# Patient Record
Sex: MALE | Race: WHITE | URBAN - NONMETROPOLITAN AREA
[De-identification: names, ages, dates, MRNs, and addresses within clinical notes are randomized per-mention and may not be internally consistent; named-entity substitution may affect disease eponyms.]

---

## 2017-09-22 ENCOUNTER — TRANSFERRED RECORDS (OUTPATIENT)
Dept: HEALTH INFORMATION MANAGEMENT | Facility: HOSPITAL | Age: 67
End: 2017-09-22

## 2017-10-12 ENCOUNTER — OFFICE VISIT (OUTPATIENT)
Dept: SLEEP MEDICINE | Facility: HOSPITAL | Age: 67
End: 2017-10-12
Attending: INTERNAL MEDICINE
Payer: COMMERCIAL

## 2017-10-12 VITALS
DIASTOLIC BLOOD PRESSURE: 62 MMHG | WEIGHT: 248 LBS | OXYGEN SATURATION: 91 % | HEART RATE: 62 BPM | RESPIRATION RATE: 12 BRPM | BODY MASS INDEX: 38.92 KG/M2 | SYSTOLIC BLOOD PRESSURE: 130 MMHG | HEIGHT: 67 IN

## 2017-10-12 DIAGNOSIS — G47.33 OBSTRUCTIVE SLEEP APNEA SYNDROME: Primary | ICD-10-CM

## 2017-10-12 PROCEDURE — 99212 OFFICE O/P EST SF 10 MIN: CPT

## 2017-10-12 PROCEDURE — 99212 OFFICE O/P EST SF 10 MIN: CPT | Performed by: INTERNAL MEDICINE

## 2017-10-12 RX ORDER — VENLAFAXINE HYDROCHLORIDE 150 MG/1
150 TABLET, EXTENDED RELEASE ORAL
COMMUNITY

## 2017-10-12 RX ORDER — VENLAFAXINE HYDROCHLORIDE 75 MG/1
75 TABLET, EXTENDED RELEASE ORAL
COMMUNITY

## 2017-10-12 NOTE — NURSING NOTE
,Patient ID checked with name and date of birth. Reviewed allergies and home medications. Took Vitals and brief history.   schedled the patient for an overnight sleep test and reviewed the instructions with him and his wife they verbalized understanding

## 2017-10-12 NOTE — PROGRESS NOTES
"68 y/o referred by Juan Galeano with CMAILO. Pts wife here with him, she notes loud snoring and occ apneas. Sleep hours are [place] to 10, freq wake ups. Occ morning headaches,also restless legs. A sleep study done years ago show a lot of leg movements. He is sleepy during the day but it doesn't bother him much. His weight is up 15 lbs in the past 5 years.     PMH CAD/CABG/? PAH, DM T2, hypertension, hx PLMs, chronic back pain, hx of cardiomyopathy      SH , we treat his wife for CAMILO, his daughter has 'snoring arousals'    PE  /62  Pulse 62  Resp 12  Ht 5' 7\" (1.702 m)  Wt 248 lb (112.5 kg)  SpO2 91%  BMI 38.84 kg/m2                  Heent narrowed pharynx,       Current Outpatient Prescriptions:      Metoprolol Succinate (TOPROL XL PO), Take 25 mg by mouth daily, Disp: , Rfl:      TRAZODONE HCL PO, Take 50 mg by mouth At Bedtime, Disp: , Rfl:      venlafaxine (EFFEXOR-ER) 150 MG TB24 24 hr tablet, Take 150 mg by mouth daily (with breakfast), Disp: , Rfl:      venlafaxine (EFFEXOR-ER) 75 MG TB24 24 hr tablet, Take 75 mg by mouth daily (with breakfast), Disp: , Rfl:      LISINOPRIL PO, Take 10 mg by mouth daily, Disp: , Rfl:      insulin glargine (LANTUS) 100 UNIT/ML injection, Inject Subcutaneous At Bedtime, Disp: , Rfl:      insulin aspart prot & aspart (NOVOLOG MIX 70/30 FLEXPEN) injection, Inject Subcutaneous 2 times daily (with meals), Disp: , Rfl:      A/ CAMILO and poss PLMs, sleep study ordered.   "

## 2017-10-12 NOTE — MR AVS SNAPSHOT
After Visit Summary   10/12/2017    Mateo Reyes    MRN: [id number]           Patient Information     Date Of Birth          1950        Visit Information        Provider Department      10/12/2017 1:30 PM Gelacio Zhong MD [facility]        Today's Diagnoses     Obstructive sleep apnea syndrome    -  1      Care Instructions    In order to help your stay at the Sleep Center to be as comfortable as possible and to obtain the best sleep study possible, the Sleep Center Staff has established the following guidelines:    1.  Please attempt to be here 15 minutes prior to your scheduled appointment time.  If you anticipate being late or you cannot make your overnight appointment, please call us at [phone] or call [phone] and ask for the Sleep Center.  PLEASE MAKE EVERY ATTEMPT TO MAKE YOUR APPOINTMENT.  A SLEEP TECHNICIAN AND A SLEEP ROOM HAS BEEN RESERVED JUST FOR YOU.    2. When you arrive at [facility], please park in the upper lot, by [address].  Enter the hospital at the Emergency Room Entrance.  Follow the signs to the Emergency Room Admitting Desk and tell them you are here for a sleep study in the Sleep Disorder Center.    3. Do not stop any medications, unless specifically requested.  Be sure to bring all medications that you need with you.    4. Do not use any hair creams or gels, moisturizers, rinses or sprays the day of your study.  FOR MALES:  if you are usually clean shaven, please shave before you come in; FOR FEMALES:  do not wear make-up or be prepared to remove it.  This will improve the quality of the study.    5. Please DO NOT USE CAFFEINE OR ALCOHOL after 2:00 PM the day of your test unless advised otherwise.    6. Do not take any naps the day of your test.    7. Attachment of monitoring equipment will take approximately one hour, including an explanation of the test.    8. Bring comfortable night clothes to sleep in, two-piece, cotton pajamas or shorts  are best.    9. If you have a pillow that you prefer using, please bring it with you; but do not forget to take it home with you in the morning.    10. Most of our studies are complete by approximately 7:00 AM.  In most instances we may wake you during your normal wake time or the time you request to be awakened.    If you have any special needs or questions related to this information or your test, please feel free to call the Sleep Disorder Center at [phone] or [phone] and ask for the Sleep Disorder Center.  Please make every effort to keep your appointment.  A sleep technician and a sleep room have been reserved just for you.  In the event that you are unable to make your appointment, please call as soon as possible to notify us of your cancellation.            Follow-ups after your visit        Future tests that were ordered for you today     Open Future Orders        Priority Expected Expires Ordered    Comprehensive Sleep Study Routine  4/10/2018 10/12/2017            Who to contact     If you have questions or need follow up information about today's clinic visit or your schedule please contact [facility] directly at [phone].  Normal or non-critical lab and imaging results will be communicated to you by [employer] Grou[employer], letter or phone within 4 business days after the clinic has received the results. If you do not hear from us within 7 days, please contact the clinic through [employer] or phone. If you have a critical or abnormal lab result, we will notify you by phone as soon as possible.  Submit refill requests through [employer] or call your pharmacy and they will forward the refill request to us. Please allow 3 business days for your refill to be completed.          Additional Information About Your Visit        [employer] Information     [employer] lets you send messages to your doctor, view your test results, renew your prescriptions, schedule appointments and more. To sign up, go to [email or web address] .  "Click on \"Log in\" on the left side of the screen, which will take you to the Welcome page. Then click on \"Sign up Now\" on the right side of the page.     You will be asked to enter the access code listed below, as well as some personal information. Please follow the directions to create your username and password.     Your access code is: [email or web address]  Expires: 1/10/2018  1:55 PM     Your access code will  in 90 days. If you need help or a new code, please call your [place] clinic or [phone].        Care EveryWhere ID     This is your Care EveryWhere ID. This could be used by other organizations to access your [place] medical records  [id number]        Your Vitals Were     Pulse Respirations Height Pulse Oximetry BMI (Body Mass Index)       62 12 5' 7\" (1.702 m) 91% 38.84 kg/m2        Blood Pressure from Last 3 Encounters:   10/12/17 130/62    Weight from Last 3 Encounters:   10/12/17 248 lb (112.5 kg)               Primary Care Provider Office Phone # Fax #    Marleny Galeano, CATE [phone]       [facility] MEDICINE [address]  [facility] [address]        Equal Access to Services     AILYN PALMER AH: Hadii aad ku hadasho Soomaali, waaxda luqadaha, qaybta kaalmada adeegyada, waxay idiin haywillyn gurinder brandt . So [facility] [phone].    ATENCIÓN: Si habla español, tiene a marina disposición servicios gratuitos de asistencia lingüística. ame al [phone].    We comply with applicable federal civil rights laws and [place] laws. We do not discriminate on the basis of race, color, national origin, age, disability, sex, sexual orientation, or gender identity.            Thank you!     Thank you for choosing [facility]  for your care. Our goal is always to provide you with excellent care. Hearing back from our patients is one way we can continue to improve our services. Please take a few minutes to complete the written survey that you may receive in the mail after your visit " with us. Thank you!             Your Updated Medication List - Protect others around you: Learn how to safely use, store and throw away your medicines at www.disposemymeds.org.          This list is accurate as of: 10/12/17  1:55 PM.  Always use your most recent med list.                   Brand Name Dispense Instructions for use Diagnosis    insulin glargine 100 UNIT/ML injection    LANTUS     Inject Subcutaneous At Bedtime        LISINOPRIL PO      Take 10 mg by mouth daily        NovoLOG MIX 70/30 FLEXPEN injection   Generic drug:  insulin aspart prot & aspart      Inject Subcutaneous 2 times daily (with meals)        TOPROL XL PO      Take 25 mg by mouth daily        TRAZODONE HCL PO      Take 50 mg by mouth At Bedtime        * venlafaxine 150 MG Tb24 24 hr tablet    EFFEXOR-ER     Take 150 mg by mouth daily (with breakfast)        * venlafaxine 75 MG Tb24 24 hr tablet    EFFEXOR-ER     Take 75 mg by mouth daily (with breakfast)        * Notice:  This list has 2 medication(s) that are the same as other medications prescribed for you. Read the directions carefully, and ask your doctor or other care provider to review them with you.

## 2017-10-12 NOTE — PATIENT INSTRUCTIONS
In order to help your stay at the Sleep Center to be as comfortable as possible and to obtain the best sleep study possible, the Sleep Center Staff has established the following guidelines:    1.  Please attempt to be here 15 minutes prior to your scheduled appointment time.  If you anticipate being late or you cannot make your overnight appointment, please call us at [phone] or call [phone] and ask for the Sleep Center.  PLEASE MAKE EVERY ATTEMPT TO MAKE YOUR APPOINTMENT.  A SLEEP TECHNICIAN AND A SLEEP ROOM HAS BEEN RESERVED JUST FOR YOU.    2. When you arrive at [facility], please park in the upper lot, by [address].  Enter the hospital at the Emergency Room Entrance.  Follow the signs to the Emergency Room Admitting Desk and tell them you are here for a sleep study in the Sleep Disorder Center.    3. Do not stop any medications, unless specifically requested.  Be sure to bring all medications that you need with you.    4. Do not use any hair creams or gels, moisturizers, rinses or sprays the day of your study.  FOR MALES:  if you are usually clean shaven, please shave before you come in; FOR FEMALES:  do not wear make-up or be prepared to remove it.  This will improve the quality of the study.    5. Please DO NOT USE CAFFEINE OR ALCOHOL after 2:00 PM the day of your test unless advised otherwise.    6. Do not take any naps the day of your test.    7. Attachment of monitoring equipment will take approximately one hour, including an explanation of the test.    8. Bring comfortable night clothes to sleep in, two-piece, cotton pajamas or shorts are best.    9. If you have a pillow that you prefer using, please bring it with you; but do not forget to take it home with you in the morning.    10. Most of our studies are complete by approximately 7:00 AM.  In most instances we may wake you during your normal wake time or the time you request to be awakened.    If you have any special needs or  questions related to this information or your test, please feel free to call the Sleep Disorder Center at [phone] or [phone] and ask for the Sleep Disorder Center.  Please make every effort to keep your appointment.  A sleep technician and a sleep room have been reserved just for you.  In the event that you are unable to make your appointment, please call as soon as possible to notify us of your cancellation.

## 2017-10-18 ENCOUNTER — THERAPY VISIT (OUTPATIENT)
Dept: SLEEP MEDICINE | Facility: HOSPITAL | Age: 67
End: 2017-10-18
Attending: INTERNAL MEDICINE
Payer: COMMERCIAL

## 2017-10-18 DIAGNOSIS — G47.33 OBSTRUCTIVE SLEEP APNEA SYNDROME: ICD-10-CM

## 2017-10-18 PROCEDURE — 95810 POLYSOM 6/> YRS 4/> PARAM: CPT

## 2017-10-18 PROCEDURE — 95810 POLYSOM 6/> YRS 4/> PARAM: CPT | Mod: 26 | Performed by: INTERNAL MEDICINE

## 2017-10-18 NOTE — MR AVS SNAPSHOT
"              After Visit Summary   10/18/2017    Mateo Reyes    MRN: [id number]           Patient Information     Date Of Birth          1950        Visit Information        Provider Department      10/18/2017 7:30 PM [facility]        Today's Diagnoses     Obstructive sleep apnea syndrome           Follow-ups after your visit        Who to contact     If you have questions or need follow up information about today's clinic visit or your schedule please contact [facility] directly at [phone].  Normal or non-critical lab and imaging results will be communicated to you by [employer], letter or phone within 4 business days after the clinic has received the results. If you do not hear from us within 7 days, please contact the clinic through [employer] or phone. If you have a critical or abnormal lab result, we will notify you by phone as soon as possible.  Submit refill requests through [employer] or call your pharmacy and they will forward the refill request to us. Please allow 3 business days for your refill to be completed.          Additional Information About Your Visit        [employer] Information     [employer] lets you send messages to your doctor, view your test results, renew your prescriptions, schedule appointments and more. To sign up, go to [email or web address] . Click on \"Log in\" on the left side of the screen, which will take you to the Welcome page. Then click on \"Sign up Now\" on the right side of the page.     You will be asked to enter the access code listed below, as well as some personal information. Please follow the directions to create your username and password.     Your access code is: [email or web address]  Expires: 1/10/2018  1:55 PM     Your access code will  in 90 days. If you need help or a new code, please call your Center clinic or [phone].        Care EveryWhere ID     This is your Care EveryWhere ID. This could be used by other organizations to access your " [place] medical records  [id number]         Blood Pressure from Last 3 Encounters:   10/12/17 130/62    Weight from Last 3 Encounters:   10/12/17 248 lb (112.5 kg)              We Performed the Following     Comprehensive Sleep Study     SLEEP STUDY - HIM SCAN        Primary Care Provider Office Phone # Fax #    Marleny Galeano CATE [phone]       [facility] MEDICINE [address]  [facility] [address]        Equal Access to Services     AILYN PALMER : Hadii aad ku hadasho Soomaali, waaxda luqadaha, qaybta kaalmada adeegyada, waxay idiin hayaan adeeg kharash la'aan . So [facility] [phone].    ATENCIÓN: Si habla español, tiene a marina disposición servicios gratuitos de asistencia lingüística. [facility] [phone].    We comply with applicable federal civil rights laws and [place] laws. We do not discriminate on the basis of race, color, national origin, age, disability, sex, sexual orientation, or gender identity.            Thank you!     Thank you for choosing [facility]  for your care. Our goal is always to provide you with excellent care. Hearing back from our patients is one way we can continue to improve our services. Please take a few minutes to complete the written survey that you may receive in the mail after your visit with us. Thank you!             Your Updated Medication List - Protect others around you: Learn how to safely use, store and throw away your medicines at www.disposemymeds.org.          This list is accurate as of: 10/18/17 11:59 PM.  Always use your most recent med list.                   Brand Name Dispense Instructions for use Diagnosis    insulin glargine 100 UNIT/ML injection    LANTUS     Inject Subcutaneous At Bedtime        LISINOPRIL PO      Take 10 mg by mouth daily        NovoLOG MIX 70/30 FLEXPEN injection   Generic drug:  insulin aspart prot & aspart      Inject Subcutaneous 2 times daily (with meals)        TOPROL XL PO      Take 25 mg by mouth daily         TRAZODONE HCL PO      Take 50 mg by mouth At Bedtime        * venlafaxine 150 MG Tb24 24 hr tablet    EFFEXOR-ER     Take 150 mg by mouth daily (with breakfast)        * venlafaxine 75 MG Tb24 24 hr tablet    EFFEXOR-ER     Take 75 mg by mouth daily (with breakfast)        * Notice:  This list has 2 medication(s) that are the same as other medications prescribed for you. Read the directions carefully, and ask your doctor or other care provider to review them with you.

## 2017-10-19 NOTE — PROGRESS NOTES
The Pt was identified by name and  as well as wristband.  He is a 66 yo man with c/o poor sleep.  His wife reports snoring and apneas.  Sleep testing and possible findings were discussed during hook up.  CAMILO and CPAP therapy were explained as he was fitted with a ResMed Airfit N20 large mask.  Sleep onset was normal.  He exhibits mostly moderate snoring with frequent snore arousals.  There were also PLM arousals noted.  No REM sleep was noted until the end of the study. The SpO2 baseline dropped to 90% in REM but still no SDB.  The SpO2 baseline in sleep was 92%. The lowest SpO2 was 88%.  He had scattered PVCs.  CPAP was not attempted as he did not appear to meet criteria The preliminary results were discussed.  After reviewing the vendor choices, he prefers   [employer] for his DME.

## 2017-10-23 NOTE — PROGRESS NOTES
Patient is a 68 y/o male in with c/o snoring, observed apnea and poor sleep.  Moderate snoring with scattered arousals noted.  PLM's noted t/o study, some may have been respiratory related.  REM latency delayed.  Patient was not attempted on CPAP as he did not appear to meet criteria.  Patient tolerated study well.

## 2017-10-24 NOTE — PROGRESS NOTES
66 y/o referred by Juan Galeano for excessive daytime somnolence.   Overnight 18 channel polysomnography was done 10/18, I reviewed the raw data in detail.Please see scanned sleep study for full results.Sleep efficiency was normal with a normal sleep latency and a prolonged REM latency. Sleep architecture shows all stages seen in usual amounts for age. Baseline oxyhemoglobin saturation was 92%. The ECG was monitored and no arrhythmias were seen. There were frequent legs movements and arousals, the PLM index was 90.             Technician noted moderate snoring. We measured no significant sleep disordered breathing, the AHI was 0.    Impression: PLMS  Will discuss treatment from my office.

## 2017-11-14 ENCOUNTER — OFFICE VISIT (OUTPATIENT)
Dept: SLEEP MEDICINE | Facility: HOSPITAL | Age: 67
End: 2017-11-14
Attending: INTERNAL MEDICINE
Payer: COMMERCIAL

## 2017-11-14 DIAGNOSIS — G25.81 RESTLESS LEGS SYNDROME (RLS): Primary | ICD-10-CM

## 2017-11-14 PROCEDURE — 99211 OFF/OP EST MAY X REQ PHY/QHP: CPT

## 2017-11-14 PROCEDURE — 99211 OFF/OP EST MAY X REQ PHY/QHP: CPT | Performed by: INTERNAL MEDICINE

## 2017-11-14 RX ORDER — GABAPENTIN 300 MG/1
CAPSULE ORAL
Qty: 60 CAPSULE | Refills: 0 | Status: SHIPPED | OUTPATIENT
Start: 2017-11-14 | End: 2017-12-14

## 2017-11-14 NOTE — MR AVS SNAPSHOT
"              After Visit Summary   11/14/2017    Mateo Reyes    MRN: [id number]           Patient Information     Date Of Birth          1950        Visit Information        Provider Department      11/14/2017 2:30 PM Gelacio Zhong MD [facility]        Today's Diagnoses     Restless legs syndrome (RLS)    -  1       Follow-ups after your visit        Your next 10 appointments already scheduled     Dec 19, 2017  2:30 PM CST   Return Visit with Gelacio Zhong MD   [facility] ([facility] )    [address] [place] [address]   [phone]              Who to contact     If you have questions or need follow up information about today's clinic visit or your schedule please contact [facility] directly at [phone].  Normal or non-critical lab and imaging results will be communicated to you by [employer], letter or phone within 4 business days after the clinic has received the results. If you do not hear from us within 7 days, please contact the clinic through [employer] or phone. If you have a critical or abnormal lab result, we will notify you by phone as soon as possible.  Submit refill requests through [employer] or call your pharmacy and they will forward the refill request to us. Please allow 3 business days for your refill to be completed.          Additional Information About Your Visit        [employer] Information     [employer] lets you send messages to your doctor, view your test results, renew your prescriptions, schedule appointments and more. To sign up, go to [email or web address] . Click on \"Log in\" on the left side of the screen, which will take you to the Welcome page. Then click on \"Sign up Now\" on the right side of the page.     You will be asked to enter the access code listed below, as well as some personal information. Please follow the directions to create your username and password.     Your access code is: [email or web address]  Expires: 1/10/2018 12:55 PM     Your access code " will  in 90 days. If you need help or a new code, please call your [place] clinic or [phone].        Care EveryWhere ID     This is your Care EveryWhere ID. This could be used by other organizations to access your [place] medical records  [id number]         Blood Pressure from Last 3 Encounters:   10/12/17 130/62    Weight from Last 3 Encounters:   10/12/17 248 lb (112.5 kg)              Today, you had the following     No orders found for display         Today's Medication Changes          These changes are accurate as of: 17  3:08 PM.  If you have any questions, ask your nurse or doctor.               Start taking these medicines.        Dose/Directions    gabapentin 300 MG capsule   Commonly known as:  NEURONTIN   Used for:  Restless legs syndrome (RLS)        Take 1 tablet qhs for a week, then 2 tabs po qhs   Quantity:  60 capsule   Refills:  0            Where to get your medicines      These medications were sent to [facility] [address] [place] [address]  AT [facility] OF [facility] [address] &    [facility] [address]     Phone:  [phone]     gabapentin 300 MG capsule                Primary Care Provider Office Phone # Fax #    Marleny Galeano, CATE [phone]       [facility] [address] [facility] [address]        Equal Access to Services     AILYN PALMER AH: Hadii aad ku hadasho Soomaali, waaxda luqadaha, qaybta kaalmada adeegyada, constance hernandez. So [facility] [phone].    ATENCIÓN: Si habla español, tiene a marina disposición servicios gratuitos de asistencia lingüística. Llame al [phone].    We comply with applicable federal civil rights laws and [place] laws. We do not discriminate on the basis of race, color, national origin, age, disability, sex, sexual orientation, or gender identity.            Thank you!     Thank you for choosing [facility]  for your care. Our goal is always to  provide you with excellent care. Hearing back from our patients is one way we can continue to improve our services. Please take a few minutes to complete the written survey that you may receive in the mail after your visit with us. Thank you!             Your Updated Medication List - Protect others around you: Learn how to safely use, store and throw away your medicines at www.disposemymeds.org.          This list is accurate as of: 11/14/17  3:08 PM.  Always use your most recent med list.                   Brand Name Dispense Instructions for use Diagnosis    gabapentin 300 MG capsule    NEURONTIN    60 capsule    Take 1 tablet qhs for a week, then 2 tabs po qhs    Restless legs syndrome (RLS)       insulin glargine 100 UNIT/ML injection    LANTUS     Inject Subcutaneous At Bedtime        LISINOPRIL PO      Take 10 mg by mouth daily        NovoLOG MIX 70/30 FLEXPEN injection   Generic drug:  insulin aspart prot & aspart      Inject Subcutaneous 2 times daily (with meals)        TOPROL XL PO      Take 25 mg by mouth daily        TRAZODONE HCL PO      Take 50 mg by mouth At Bedtime        * venlafaxine 150 MG Tb24 24 hr tablet    EFFEXOR-ER     Take 150 mg by mouth daily (with breakfast)        * venlafaxine 75 MG Tb24 24 hr tablet    EFFEXOR-ER     Take 75 mg by mouth daily (with breakfast)        * Notice:  This list has 2 medication(s) that are the same as other medications prescribed for you. Read the directions carefully, and ask your doctor or other care provider to review them with you.

## 2017-11-14 NOTE — PROGRESS NOTES
Sleep study discussed, no CAMILO but a lot of PLMs, arousal index of 90!   He doesn't really have RLS, his wife does note a lot of twitching at night. He is sleepy during the day so I'll try neurotin, back in a month.  There were no vitals taken for this visit.

## 2017-12-14 DIAGNOSIS — G25.81 RESTLESS LEGS SYNDROME (RLS): ICD-10-CM

## 2017-12-14 RX ORDER — GABAPENTIN 300 MG/1
CAPSULE ORAL
Qty: 60 CAPSULE | Refills: 0 | Status: SHIPPED | OUTPATIENT
Start: 2017-12-14

## 2017-12-20 ENCOUNTER — OFFICE VISIT (OUTPATIENT)
Dept: SLEEP MEDICINE | Facility: HOSPITAL | Age: 67
End: 2017-12-20
Attending: INTERNAL MEDICINE
Payer: COMMERCIAL

## 2017-12-20 VITALS
HEART RATE: 62 BPM | SYSTOLIC BLOOD PRESSURE: 140 MMHG | RESPIRATION RATE: 14 BRPM | OXYGEN SATURATION: 94 % | DIASTOLIC BLOOD PRESSURE: 60 MMHG

## 2017-12-20 DIAGNOSIS — G25.81 RESTLESS LEGS SYNDROME (RLS): Primary | ICD-10-CM

## 2017-12-20 PROCEDURE — 99211 OFF/OP EST MAY X REQ PHY/QHP: CPT | Performed by: INTERNAL MEDICINE

## 2017-12-20 PROCEDURE — 99211 OFF/OP EST MAY X REQ PHY/QHP: CPT

## 2017-12-20 RX ORDER — GABAPENTIN 600 MG/1
TABLET ORAL
Qty: 90 TABLET | Refills: 3 | Status: SHIPPED | OUTPATIENT
Start: 2017-12-20

## 2017-12-20 NOTE — MR AVS SNAPSHOT
"              After Visit Summary   2017    Mateo Reyes    MRN: [id number]           Patient Information     Date Of Birth          1950        Visit Information        Provider Department      2017 2:30 PM Gelacio Zhong MD [facility]        Today's Diagnoses     Restless legs syndrome (RLS)    -  1       Follow-ups after your visit        Who to contact     If you have questions or need follow up information about today's clinic visit or your schedule please contact [facility] directly at [phone].  Normal or non-critical lab and imaging results will be communicated to you by MyChart, letter or phone within 4 business days after the clinic has received the results. If you do not hear from us within 7 days, please contact the clinic through [facility] or phone. If you have a critical or abnormal lab result, we will notify you by phone as soon as possible.  Submit refill requests through [facility] or call your pharmacy and they will forward the refill request to us. Please allow 3 business days for your refill to be completed.          Additional Information About Your Visit        MyChart Information     [facility] lets you send messages to your doctor, view your test results, renew your prescriptions, schedule appointments and more. To sign up, go to [email or web address] . Click on \"Log in\" on the left side of the screen, which will take you to the Welcome page. Then click on \"Sign up Now\" on the right side of the page.     You will be asked to enter the access code listed below, as well as some personal information. Please follow the directions to create your username and password.     Your access code is: [email or web address]  Expires: 1/10/2018 12:55 PM     Your access code will  in 90 days. If you need help or a new code, please call your [facility] clinic or [phone].        Care EveryWhere ID     This is your Care EveryWhere ID. This could be used by other organizations to access your " [facility] medical records  [id number]        Your Vitals Were     Pulse Respirations Pulse Oximetry             62 14 94%          Blood Pressure from Last 3 Encounters:   12/20/17 140/60   10/12/17 130/62    Weight from Last 3 Encounters:   10/12/17 248 lb (112.5 kg)              Today, you had the following     No orders found for display         Today's Medication Changes          These changes are accurate as of: 12/20/17  2:45 PM.  If you have any questions, ask your nurse or doctor.               These medicines have changed or have updated prescriptions.        Dose/Directions    * gabapentin 300 MG capsule   Commonly known as:  NEURONTIN   This may have changed:  Another medication with the same name was added. Make sure you understand how and when to take each.   Used for:  Restless legs syndrome (RLS)        Take 1 tablet qhs for a week, then 2 tabs po qhs   Quantity:  60 capsule   Refills:  0       * gabapentin 600 MG tablet   Commonly known as:  NEURONTIN   This may have changed:  You were already taking a medication with the same name, and this prescription was added. Make sure you understand how and when to take each.   Used for:  Restless legs syndrome (RLS)        1 po qhs   Quantity:  90 tablet   Refills:  3       * Notice:  This list has 2 medication(s) that are the same as other medications prescribed for you. Read the directions carefully, and ask your doctor or other care provider to review them with you.         Where to get your medicines      These medications were sent to [facility] - [place], [place] - [address] AT [address] [facility]  [address], [facility] [address]     Phone:  [phone]     gabapentin 600 MG tablet                Primary Care Provider Office Phone # Fax #    Marleny Galeano [phone]       [facility] [address] [facility] [address]        Equal Access to Services     AILYN PALMER  AH: Hadii dagoberto ohdennisjovani Tyron, waaxda luqadaha, qaybta kanorma reid, constance guanakoin hayaaallyssa keaneann-marie perdomo laRoulaluis hernandez. [facility] [phone].    ATENCIÓN: Si quinnla santos, tiene a marina disposición servicios gratuitos de asistencia lingüística. Llame al [phone].    We comply with applicable federal civil rights laws and [place] laws. We do not discriminate on the basis of race, color, national origin, age, disability, sex, sexual orientation, or gender identity.            Thank you!     Thank you for choosing [facility]  for your care. Our goal is always to provide you with excellent care. Hearing back from our patients is one way we can continue to improve our services. Please take a few minutes to complete the written survey that you may receive in the mail after your visit with us. Thank you!             Your Updated Medication List - Protect others around you: Learn how to safely use, store and throw away your medicines at www.disposemymeds.org.          This list is accurate as of: 12/20/17  2:45 PM.  Always use your most recent med list.                   Brand Name Dispense Instructions for use Diagnosis    * gabapentin 300 MG capsule    NEURONTIN    60 capsule    Take 1 tablet qhs for a week, then 2 tabs po qhs    Restless legs syndrome (RLS)       * gabapentin 600 MG tablet    NEURONTIN    90 tablet    1 po qhs    Restless legs syndrome (RLS)       insulin glargine 100 UNIT/ML injection    LANTUS     Inject Subcutaneous At Bedtime        LISINOPRIL PO      Take 10 mg by mouth daily        NovoLOG MIX 70/30 FLEXPEN injection   Generic drug:  insulin aspart prot & aspart      Inject Subcutaneous 2 times daily (with meals)        TOPROL XL PO      Take 25 mg by mouth daily        TRAZODONE HCL PO      Take 50 mg by mouth At Bedtime        * venlafaxine 150 MG Tb24 24 hr tablet    EFFEXOR-ER     Take 150 mg by mouth daily (with breakfast)        * venlafaxine 75 MG Tb24 24 hr tablet    EFFEXOR-ER      Take 75 mg by mouth daily (with breakfast)        * Notice:  This list has 4 medication(s) that are the same as other medications prescribed for you. Read the directions carefully, and ask your doctor or other care provider to review them with you.

## 2017-12-20 NOTE — PROGRESS NOTES
Doing well on neurontin now 600 mg, restless twitchiness much better, feels a little less sleepy during the day. Will continue for now, no side effects.  PE  /60  Pulse 62  Resp 14  SpO2 94%      A / PLMs, as above.